# Patient Record
Sex: FEMALE | Race: WHITE | Employment: FULL TIME | URBAN - METROPOLITAN AREA
[De-identification: names, ages, dates, MRNs, and addresses within clinical notes are randomized per-mention and may not be internally consistent; named-entity substitution may affect disease eponyms.]

---

## 2020-10-22 PROBLEM — D64.9 ANEMIA: Status: ACTIVE | Noted: 2020-10-22

## 2020-10-22 PROBLEM — J45.909 ASTHMA: Status: ACTIVE | Noted: 2020-10-22

## 2021-12-23 PROBLEM — D50.9 ANEMIA, IRON DEFICIENCY: Status: ACTIVE | Noted: 2021-12-23

## 2023-06-01 PROBLEM — M25.561 ACUTE PAIN OF RIGHT KNEE: Status: ACTIVE | Noted: 2023-06-01

## 2023-06-30 PROBLEM — S83.241A ACUTE MEDIAL MENISCUS TEAR OF RIGHT KNEE: Status: ACTIVE | Noted: 2023-06-30

## 2023-06-30 PROBLEM — S83.411D: Status: ACTIVE | Noted: 2023-06-30

## 2023-11-01 ENCOUNTER — OFFICE VISIT (OUTPATIENT)
Dept: VASCULAR SURGERY | Age: 48
End: 2023-11-01
Payer: COMMERCIAL

## 2023-11-01 VITALS
TEMPERATURE: 96.9 F | HEIGHT: 70 IN | DIASTOLIC BLOOD PRESSURE: 83 MMHG | BODY MASS INDEX: 36.88 KG/M2 | RESPIRATION RATE: 18 BRPM | HEART RATE: 76 BPM | WEIGHT: 257.6 LBS | SYSTOLIC BLOOD PRESSURE: 136 MMHG

## 2023-11-01 DIAGNOSIS — I65.22 CAROTID ARTERY STENOSIS, ASYMPTOMATIC, LEFT: Primary | ICD-10-CM

## 2023-11-01 PROCEDURE — 99205 OFFICE O/P NEW HI 60 MIN: CPT | Performed by: INTERNAL MEDICINE

## 2023-11-01 NOTE — PROGRESS NOTES
Neville Bear was seen on 11/1/2023 for   Chief Complaint   Patient presents with    New Patient     New patient-here for carotid artery stenosis and varicose veins. Patient states she had vascular imaging completed last year for Dr. Lucretia Fox. Carotid US 6/7/22 and leg US 10/7/22. Wears compression. Denies any current leg pain or swelling. .  11/1/23 1st [facility] visit. Has been seen in past by Dr Lucretia Fox. Saw him only once. Referred by Dr April Saunders. 6/7/22 carotid duplex [facility] 38-72 TREY, < 50 LICA, verts antegrade. States that it was done because her BP was labile. No hx of stroke. No focal neuro sx. FH neg for stroke. Takes asa, simva, and is a nonsmoker. Bp is typically in 120's. Left >right varicose veins posterior. Dr Lucretia Fox suggested compression. Lots of standing with her current position. No treatment. Mother had vv, but also not treated. Legs don't hurt, ache, even at the end of the day. No DVT. 10/7/22 duplex showed femoral reflux and very mild lgsv reflux. No DVT. Social History     Socioeconomic History    Marital status:      Spouse name: Not on file    Number of children: Not on file    Years of education: Not on file    Highest education level: Not on file   Occupational History    Not on file   Tobacco Use    Smoking status: Never    Smokeless tobacco: Never    Tobacco comments:     never a smoker   Vaping Use    Vaping Use: Never used   Substance and Sexual Activity    Alcohol use: Never    Drug use: Never    Sexual activity: Not on file   Other Topics Concern    Not on file   Social History Narrative    Not on file     Social Determinants of Health     Financial Resource Strain: Not on file   Food Insecurity: Not on file   Transportation Needs: Not on file   Physical Activity: Not on file   Stress: Not on file   Social Connections: Not on file   Intimate Partner Violence: Not on file   Housing Stability: Not on file     History reviewed. No pertinent family history.   Past

## 2023-11-01 NOTE — PROGRESS NOTES
Kell Michel was seen on 11/1/2023 for   Chief Complaint   Patient presents with    New Patient     New patient-here for carotid artery stenosis and varicose veins. Patient states she had vascular imaging completed last year for Dr. Gregorio Blancas. Carotid US 6/7/22 and leg US 10/7/22. Wears compression. Denies any current leg pain or swelling. Jennifer Ospina                             REVIEW OF SYSTEMS    Constitutional Weight: absent, A, Fatigue: absent Fever: absent   HEENT Ears: normal,Visual disturbance: absent Sore throat: absent,    Respiratory Shortness of breath: absent, Cough: absent;, Snoring: absent   Cardivascular Chest pain: absent,  Leg pain: absent,Leg swelling:absent, Non-healing wound:absent    GI Diarrhea: absent, Abdominal Pain: absent    Urinary frequency: absent, Urinary incontinence: absent   Musculoskeletal Neck pain: absent, Back pain: absent, Restless Leg:absent     Dermatological Hair loss: absent, Skin changes: absent   Neurological  Sudden Loss of Vision in one eye:absent, Slurred Speech:absent, Weakness on one side of body: absent,Headache: absent   Psychiatric Anxiety: absent, Depression: absent   Hematologic Abnormal bleeding: absent,

## 2023-11-01 NOTE — PATIENT INSTRUCTIONS
SURVEY:    You may be receiving a survey from [employer] regarding your visit today. Please complete the survey to enable us to provide the highest quality of care to you and your family. If you cannot score us a very good on any question, please call the office to discuss how we could have made your experience a very good one. Thank you!     Dr. Pam Alcaraz

## 2023-11-09 ENCOUNTER — HOSPITAL ENCOUNTER (OUTPATIENT)
Dept: VASCULAR LAB | Age: 48
Discharge: HOME | End: 2023-11-11
Attending: INTERNAL MEDICINE
Payer: COMMERCIAL

## 2023-11-09 DIAGNOSIS — I65.22 CAROTID ARTERY STENOSIS, ASYMPTOMATIC, LEFT: ICD-10-CM

## 2023-11-09 LAB
VAS LEFT BULB EDV: 37 CM/S
VAS LEFT BULB PSV: 93.5 CM/S
VAS LEFT CCA DIST EDV: 30.5 CM/S
VAS LEFT CCA DIST PSV: 93.5 CM/S
VAS LEFT CCA MID EDV: 33.76 CM/S
VAS LEFT CCA MID PSV: 119.38 CM/S
VAS LEFT CCA PROX EDV: 35.4 CM/S
VAS LEFT CCA PROX PSV: 119.4 CM/S
VAS LEFT ECA EDV: 15.99 CM/S
VAS LEFT ECA PSV: 77.4 CM/S
VAS LEFT ICA DIST EDV: 29.6 CM/S
VAS LEFT ICA DIST PSV: 65.9 CM/S
VAS LEFT ICA MID EDV: 57 CM/S
VAS LEFT ICA MID PSV: 120.1 CM/S
VAS LEFT ICA PROX EDV: 51.1 CM/S
VAS LEFT ICA PROX PSV: 96.4 CM/S
VAS LEFT ICA/CCA PSV: 1.28 NO UNITS
VAS LEFT VERTEBRAL EDV: 20.77 CM/S
VAS LEFT VERTEBRAL PSV: 55.9 CM/S
VAS RIGHT BULB EDV: 35.4 CM/S
VAS RIGHT BULB PSV: 83.8 CM/S
VAS RIGHT CCA DIST EDV: 48.6 CM/S
VAS RIGHT CCA DIST PSV: 139.2 CM/S
VAS RIGHT CCA MID EDV: 48.55 CM/S
VAS RIGHT CCA MID PSV: 125.22 CM/S
VAS RIGHT CCA PROX EDV: 11.5 CM/S
VAS RIGHT CCA PROX PSV: 103.2 CM/S
VAS RIGHT ECA EDV: 14.38 CM/S
VAS RIGHT ECA PSV: 104.9 CM/S
VAS RIGHT ICA DIST EDV: 54.8 CM/S
VAS RIGHT ICA DIST PSV: 122.6 CM/S
VAS RIGHT ICA MID EDV: 46.7 CM/S
VAS RIGHT ICA MID PSV: 98.4 CM/S
VAS RIGHT ICA PROX EDV: 43.5 CM/S
VAS RIGHT ICA PROX PSV: 91.9 CM/S
VAS RIGHT ICA/CCA PSV: 0.9 NO UNITS
VAS RIGHT VERTEBRAL EDV: 18.23 CM/S
VAS RIGHT VERTEBRAL PSV: 49.6 CM/S

## 2023-11-09 PROCEDURE — 93880 EXTRACRANIAL BILAT STUDY: CPT | Performed by: SURGERY

## 2023-11-09 PROCEDURE — 93880 EXTRACRANIAL BILAT STUDY: CPT

## 2023-11-14 ENCOUNTER — TELEPHONE (OUTPATIENT)
Dept: VASCULAR SURGERY | Age: 48
End: 2023-11-14

## 2025-07-21 ENCOUNTER — APPOINTMENT (OUTPATIENT)
Dept: ULTRASOUND IMAGING | Age: 50
End: 2025-07-21
Payer: COMMERCIAL

## 2025-07-21 ENCOUNTER — HOSPITAL ENCOUNTER (EMERGENCY)
Age: 50
Discharge: HOME | End: 2025-07-21
Payer: COMMERCIAL

## 2025-07-21 VITALS
DIASTOLIC BLOOD PRESSURE: 77 MMHG | RESPIRATION RATE: 16 BRPM | HEIGHT: 69 IN | SYSTOLIC BLOOD PRESSURE: 136 MMHG | HEART RATE: 87 BPM | WEIGHT: 255 LBS | TEMPERATURE: 97.6 F | BODY MASS INDEX: 37.77 KG/M2 | OXYGEN SATURATION: 97 %

## 2025-07-21 DIAGNOSIS — N93.8 DYSFUNCTIONAL UTERINE BLEEDING: Primary | ICD-10-CM

## 2025-07-21 LAB
ALBUMIN SERPL-MCNC: 4 G/DL (ref 3.5–5.2)
ALBUMIN/GLOB SERPL: 1.4 {RATIO} (ref 1–2.5)
ALP SERPL-CCNC: 69 U/L (ref 35–104)
ALT SERPL-CCNC: 13 U/L (ref 10–35)
ANION GAP SERPL CALCULATED.3IONS-SCNC: 13 MMOL/L (ref 9–16)
AST SERPL-CCNC: 18 U/L (ref 10–35)
BASOPHILS # BLD: 0.1 K/UL (ref 0–0.2)
BASOPHILS NFR BLD: 1 % (ref 0–2)
BILIRUB SERPL-MCNC: 0.3 MG/DL (ref 0–1.2)
BILIRUB UR QL STRIP: NEGATIVE
BUN SERPL-MCNC: 11 MG/DL (ref 6–20)
BUN/CREAT SERPL: 14 (ref 9–20)
CALCIUM SERPL-MCNC: 8.9 MG/DL (ref 8.6–10.4)
CHARACTER UR: ABNORMAL
CHLORIDE SERPL-SCNC: 105 MMOL/L (ref 98–107)
CLARITY UR: ABNORMAL
CO2 SERPL-SCNC: 23 MMOL/L (ref 20–31)
COLOR UR: ABNORMAL
CREAT SERPL-MCNC: 0.8 MG/DL (ref 0.5–0.9)
EOSINOPHIL # BLD: 0.23 K/UL (ref 0–0.44)
EOSINOPHILS RELATIVE PERCENT: 2 % (ref 1–4)
EPI CELLS #/AREA URNS HPF: ABNORMAL /HPF (ref 0–25)
ERYTHROCYTE [DISTWIDTH] IN BLOOD BY AUTOMATED COUNT: 13.2 % (ref 11.8–14.4)
GFR, ESTIMATED: >90 ML/MIN/1.73M2
GLUCOSE SERPL-MCNC: 98 MG/DL (ref 74–99)
GLUCOSE UR STRIP-MCNC: NEGATIVE MG/DL
HCG UR QL: NEGATIVE
HCT VFR BLD AUTO: 33.5 % (ref 36.3–47.1)
HGB BLD-MCNC: 11.2 G/DL (ref 11.9–15.1)
HGB UR QL STRIP.AUTO: ABNORMAL
IMM GRANULOCYTES # BLD AUTO: 0.03 K/UL (ref 0–0.3)
IMM GRANULOCYTES NFR BLD: 0 %
KETONES UR STRIP-MCNC: NEGATIVE MG/DL
LACTATE BLDV-SCNC: 1.2 MMOL/L (ref 0.5–2.2)
LEUKOCYTE ESTERASE UR QL STRIP: NEGATIVE
LIPASE SERPL-CCNC: 37 U/L (ref 13–60)
LYMPHOCYTES NFR BLD: 3.06 K/UL (ref 1.1–3.7)
LYMPHOCYTES RELATIVE PERCENT: 28 % (ref 24–43)
MCH RBC QN AUTO: 30.7 PG (ref 25.2–33.5)
MCHC RBC AUTO-ENTMCNC: 33.4 G/DL (ref 28.4–34.8)
MCV RBC AUTO: 91.8 FL (ref 82.6–102.9)
MONOCYTES NFR BLD: 0.82 K/UL (ref 0.1–1.2)
MONOCYTES NFR BLD: 8 % (ref 3–12)
NEUTROPHILS NFR BLD: 61 % (ref 36–65)
NEUTS SEG NFR BLD: 6.6 K/UL (ref 1.5–8.1)
NITRITE UR QL STRIP: NEGATIVE
NRBC BLD-RTO: 0 PER 100 WBC
PH UR STRIP: 5.5 [PH] (ref 5–9)
PLATELET # BLD AUTO: 325 K/UL (ref 138–453)
PMV BLD AUTO: 10.3 FL (ref 8.1–13.5)
POTASSIUM SERPL-SCNC: 3.7 MMOL/L (ref 3.7–5.3)
PROT SERPL-MCNC: 7 G/DL (ref 6.6–8.7)
PROT UR STRIP-MCNC: ABNORMAL MG/DL
RBC # BLD AUTO: 3.65 M/UL (ref 3.95–5.11)
RBC #/AREA URNS HPF: ABNORMAL /HPF (ref 0–2)
SODIUM SERPL-SCNC: 141 MMOL/L (ref 136–145)
SP GR UR STRIP: 1.01 (ref 1.01–1.02)
UROBILINOGEN UR STRIP-ACNC: NORMAL EU/DL (ref 0–1)
WBC #/AREA URNS HPF: ABNORMAL /HPF (ref 0–5)
WBC OTHER # BLD: 10.8 K/UL (ref 3.5–11.3)

## 2025-07-21 PROCEDURE — 81001 URINALYSIS AUTO W/SCOPE: CPT

## 2025-07-21 PROCEDURE — 81025 URINE PREGNANCY TEST: CPT

## 2025-07-21 PROCEDURE — 80053 COMPREHEN METABOLIC PANEL: CPT

## 2025-07-21 PROCEDURE — 83690 ASSAY OF LIPASE: CPT

## 2025-07-21 PROCEDURE — 99284 EMERGENCY DEPT VISIT MOD MDM: CPT

## 2025-07-21 PROCEDURE — 85025 COMPLETE CBC W/AUTO DIFF WBC: CPT

## 2025-07-21 PROCEDURE — 83605 ASSAY OF LACTIC ACID: CPT

## 2025-07-21 PROCEDURE — 76830 TRANSVAGINAL US NON-OB: CPT

## 2025-07-21 RX ORDER — NORETHINDRONE 5 MG/1
5 TABLET ORAL DAILY
Qty: 14 TABLET | Refills: 0 | Status: SHIPPED | OUTPATIENT
Start: 2025-07-21 | End: 2025-08-04

## 2025-07-21 ASSESSMENT — LIFESTYLE VARIABLES
HOW OFTEN DO YOU HAVE A DRINK CONTAINING ALCOHOL: NEVER
HOW MANY STANDARD DRINKS CONTAINING ALCOHOL DO YOU HAVE ON A TYPICAL DAY: PATIENT DOES NOT DRINK

## 2025-07-21 ASSESSMENT — PAIN - FUNCTIONAL ASSESSMENT: PAIN_FUNCTIONAL_ASSESSMENT: NONE - DENIES PAIN

## 2025-07-21 NOTE — DISCHARGE INSTRUCTIONS
The following is your ultrasound results from today.  Given the thickness of the endometrium it is recommended you discuss the possibility of an endometrial biopsy with your GYN on follow-up although the final decision will be theirs.    US NON OB TRANSVAGINAL W DOPPLER   Preliminary Result   1. Bilateral ovarian cysts.   2. Uterine fibroid.   3. Prominent endometrial stripe measuring 2.72 cm although the patient is   still menstruating.   4. Normal Doppler flow within the ovaries.      RECOMMENDATIONS:   OBGYN referral for very prominent endometrium.           You will receive a survey in the next couple days regarding your experience in the ED. We are constantly striving to improve our care and welcome your feedback. Thank you very much for your time.     The emergency department evaluation is not a complete evaluation, you're always required to followup with another doctor within the next few days to assess how your symptoms are progressing and to ensure that there is no indication for further testing or returning to the hospital.  Even with treatment sometimes your condition worsens and you will need to return to the hospital.  If you are having pain and it is getting worse you should return to the hospital.  If you have any new symptoms that were not addressed at your original visit you should return to the hospital. If you're having difficulty breathing but it is getting worse you should return to the hospital.  If you're vomiting and cannot take the medicines that were prescribed you should return to the hospital.  If you're having persistent fevers you should return to the hospital.  If you have any question of whether or not your symptoms are serious enough or for any other urgent concerns- always return to the hospital for repeat evaluation.

## 2025-07-21 NOTE — ED PROVIDER NOTES
Emergency Department Encounter    Note to patient: The 21st Century Cures Act requires that medical notes like this one to be available to patients in the interest of transparency. Please be advised, this is a medical document. It is intended as [id number] communication. It is written in medical language and may contain abbreviations and verbiage that may be unfamiliar. It may appear to read blunt or direct. Medical documents are intended to carry relevant medical information, facts as evident and the clinical opinion of the practitioner.      Chief Complaint  Chief Complaint   Patient presents with    Vaginal Bleeding     Pts period started July 1st and has not stopped since, bleeding has increased to where pt is saturating a pad every 30 minutes         HPI  Is a 50-year-old female who complains of vaginal bleeding.  Patient still menstruates usually goes through about 2 pads a day every 28 days last normal menstrual period was May 28 lasting until June 1, this was a normal menstrual period for the patient.  She began vaginally bleeding on July 2 and was using about 2 pads a day until 1 week ago when it increased and she was using about a pad an hour.  Today she states there has been even more bleeding and she is going through a pad every 30 minutes.  She called her gynecologist, Dr. Hendrix, she has an appointment to see her for this next Monday but that physician is out of town this week.  She spoke to an NP in the office and they ordered outpatient blood work and ultrasound to be done but given that the patient's bleeding had increased they recommend the patient come to this emergency department to be evaluated as their hospital does not have a OB/GYN department in [facility] patient denies feeling lightheaded or dizzy she denies any pain or discomfort.  She is concerned about the amount of bleeding and increased bleeding today           Past Medical History  Past Medical History:   Diagnosis Date